# Patient Record
Sex: FEMALE | Race: BLACK OR AFRICAN AMERICAN | Employment: FULL TIME | URBAN - METROPOLITAN AREA
[De-identification: names, ages, dates, MRNs, and addresses within clinical notes are randomized per-mention and may not be internally consistent; named-entity substitution may affect disease eponyms.]

---

## 2020-11-30 ENCOUNTER — MEDICAL CORRESPONDENCE (OUTPATIENT)
Dept: HEALTH INFORMATION MANAGEMENT | Facility: CLINIC | Age: 50
End: 2020-11-30

## 2020-12-11 ENCOUNTER — TRANSCRIBE ORDERS (OUTPATIENT)
Dept: OTHER | Age: 50
End: 2020-12-11

## 2020-12-11 DIAGNOSIS — Z12.11 SCREEN FOR COLON CANCER: Primary | ICD-10-CM

## 2020-12-21 ENCOUNTER — MEDICAL CORRESPONDENCE (OUTPATIENT)
Dept: HEALTH INFORMATION MANAGEMENT | Facility: CLINIC | Age: 50
End: 2020-12-21

## 2020-12-22 ENCOUNTER — APPOINTMENT (OUTPATIENT)
Dept: INTERPRETER SERVICES | Facility: CLINIC | Age: 50
End: 2020-12-22

## 2020-12-22 ENCOUNTER — TRANSCRIBE ORDERS (OUTPATIENT)
Dept: GASTROENTEROLOGY | Facility: OUTPATIENT CENTER | Age: 50
End: 2020-12-22

## 2020-12-22 DIAGNOSIS — Z12.11 SPECIAL SCREENING FOR MALIGNANT NEOPLASMS, COLON: Primary | ICD-10-CM

## 2020-12-31 ENCOUNTER — APPOINTMENT (OUTPATIENT)
Dept: INTERPRETER SERVICES | Facility: CLINIC | Age: 50
End: 2020-12-31

## 2021-03-22 ENCOUNTER — THERAPY VISIT (OUTPATIENT)
Dept: OCCUPATIONAL THERAPY | Facility: CLINIC | Age: 51
End: 2021-03-22
Payer: OTHER MISCELLANEOUS

## 2021-03-22 DIAGNOSIS — M25.531 RIGHT WRIST PAIN: ICD-10-CM

## 2021-03-22 DIAGNOSIS — M79.641 HAND PAIN, RIGHT: ICD-10-CM

## 2021-03-22 PROCEDURE — 97140 MANUAL THERAPY 1/> REGIONS: CPT | Mod: GO | Performed by: OCCUPATIONAL THERAPIST

## 2021-03-22 PROCEDURE — 97165 OT EVAL LOW COMPLEX 30 MIN: CPT | Mod: GO | Performed by: OCCUPATIONAL THERAPIST

## 2021-03-22 PROCEDURE — 97760 ORTHOTIC MGMT&TRAING 1ST ENC: CPT | Mod: GO | Performed by: OCCUPATIONAL THERAPIST

## 2021-03-22 NOTE — PROGRESS NOTES
Hand Therapy Initial Evaluation    Current Date:  3/22/2021    Diagnosis: R carpal boss and FCR tendonitis  DOI: Sept 2020; orders 1/7/21  Precautions: currently on light duty for work    Subjective:  Guy Cobian is a 51 year old female.    Patient reports symptoms of the right hand which occurred due to R hand was hit at work last September. Pt reports that something slipped and her hand was hit on metal.  Since onset symptoms are Unchanged  General health as reported by patient is good.  Pertinent medical history includes:None  Medical allergies:none.  Surgical history: none.  Medication history: None.    Current occupation is works in sorting/prepping produce for a produce .   Job Tasks: Repetitive Tasks,typically cutting, but lately mainly putting labels on boxes.     Occupational Profile Information:  Right hand dominant  Prior functional level:  no limitations  Patient reports symptoms of pain  Special tests:  at [facility], notes not available. .    Previous treatment: none  Barriers include:none  Mobility: No difficulty  Currently working in normal job with restrictions  Other: Resides in [facility] with spouse, 4 kids      Objective:  Pain Level (Scale 0-10)   3/22/2021   At Rest Mild to moderate   With Use moderate     Pain Description  Date 3/22/2021   Location R dorsal wrist and volar radial wrist   Pain Quality Aching and Tender     VISUAL INSPECTION:  DATE 3/22/2021    Right   General posture of the upper extremity: [x]   Normal   []  Comments:   Joint alignment: [x]   Normal   []  Comments:   Color:  [x]   Normal   []  Comments:   Enlargements:  There is an enlargement, flattened, to dorsal wrist, carpal boss per MD order   Skin Appearance: [x]   Normal   []  Comments:        ROM  Pain Report: - none  + mild    ++ moderate    +++ severe   Wrist 3/22/2021 3/22/2021   AROM (PROM) R L   Extension 73 + 83   Flexion 65+ 67   RD     UD     Supination WNL WNL   Pronation WNL WNL        Strength:  Pain-free /Pinch Test    3/22/2021   Trials R L   1   19 35     Lat Pinch  3/22/2021   Trials R L   1   8 13.5     3 Pt Pinch  3/22/2021   Trials R L   1           Assessment:  Patient presents with symptoms consistent with diagnosis of right wrist and hand  pain,  with conservative intervention.     Patient's limitations or Problem List includes:  Pain and Weakness of the right wrist and hand which interferes with the patient's ability to perform Self Care Tasks , Work Tasks and Household Chores as compared to previous level of function.    Rehab Potential:  Good - Return to full activity, some limitations    Patient will benefit from skilled Occupational Therapy to increase ROM and  strength and decrease pain to return to previous activity level and resume normal daily tasks and to reach their rehab potential.    Barriers to Learning:  Language Mayra  via phone on ipad for eval    Communication Issues:  Patient appears to be able to clearly communicate and understand verbal and written communication and follow directions correctly.  Patient has an  for communication clarity.    Chart Review: Brief history including review of medical and/or therapy records relating to the presenting problem and Simple history review with patient    Identified Performance Deficits: home establishment and management, meal preparation and cleanup and work    Assessment of Occupational Performance:  3-5 Performance Deficits    Clinical Decision Making (Complexity): Low complexity    Treatment Explanation:  The following has been discussed with the patient:  RX ordered/plan of care  Anticipated outcomes  Possible risks and side effects    Plan:  Frequency:  1 X week, once daily  Duration:  for 4 weeks tapering to 2 X a month over 4 weeks (Work comp - requesting 6 visits to start)  Treatment Plan:   Modalities:  US, Fluidotherapy and Paraffin  Therapeutic Exercise:  AROM, PROM, Tendon  Gliding, Place and Hold, Extensor Tracking, Isotonics, Isometrics and Stabilization  Neuromuscular re-education:  Nerve Gliding, Proprioceptive Training and Kinesiotaping  Manual Techniques:  Myofascial release and Manual edema mobilization  Orthotic Fabrication:  Static orthosis and Forearm based orthosis  Self Care:  Self Care Tasks, Ergonomic Considerations and Work Tasks    Discharge Plan:  Achieve all LTG.  Independent in home treatment program.  Reach maximal therapeutic benefit.         No

## 2021-03-31 ENCOUNTER — APPOINTMENT (OUTPATIENT)
Dept: INTERPRETER SERVICES | Facility: CLINIC | Age: 51
End: 2021-03-31

## 2021-04-06 ENCOUNTER — THERAPY VISIT (OUTPATIENT)
Dept: OCCUPATIONAL THERAPY | Facility: CLINIC | Age: 51
End: 2021-04-06
Payer: OTHER MISCELLANEOUS

## 2021-04-06 DIAGNOSIS — M25.531 RIGHT WRIST PAIN: ICD-10-CM

## 2021-04-06 DIAGNOSIS — M79.641 HAND PAIN, RIGHT: Primary | ICD-10-CM

## 2021-04-06 PROCEDURE — 97035 APP MDLTY 1+ULTRASOUND EA 15: CPT | Mod: GO | Performed by: OCCUPATIONAL THERAPIST

## 2021-04-06 PROCEDURE — 97140 MANUAL THERAPY 1/> REGIONS: CPT | Mod: GO | Performed by: OCCUPATIONAL THERAPIST

## 2021-04-06 PROCEDURE — 97110 THERAPEUTIC EXERCISES: CPT | Mod: GO | Performed by: OCCUPATIONAL THERAPIST

## 2021-04-06 NOTE — PROGRESS NOTES
SOAP note information for 4/6/2021.  Please refer to the daily flowsheet for treatment today, total treatment time and time spent performing 1:1 timed codes.       Diagnosis: R carpal boss and FCR tendonitis  DOI: Sept 2020; orders 1/7/21  Precautions: currently on light duty for work    Subjective:  The pain persists, to dorsal wrist and volar radial wrist; but it is doing better  Mainly it affects carrying heavy items, at work  especially when its cold. Pt will see MD again, and will call to make appts if indicated.    Occupational Profile Information:  Right hand dominant  Patient reports symptoms of pain  Special tests:  at [facility], notes not available. .    Currently working in normal job with restrictions  Other: Resides in [facility] with spouse, 4 kids   Current occupation is works in sorting/prepping produce for a produce .   Job Tasks: Repetitive Tasks,typically cutting, but lately mainly putting labels on boxes.     Objective:  Pain Level (Scale 0-10)   3/22/2021 4/6/2021     At Rest Mild to moderate mild   With Use moderate      Pain Description  Date 3/22/2021 4/6/2021     Location R dorsal wrist and volar radial wrist Same area, some improvement noted   Pain Quality Aching and Tender      VISUAL INSPECTION:  DATE 3/22/2021 4/6/2021      Right R   General posture of the upper extremity: [x]   Normal   []  Comments:    Joint alignment: [x]   Normal   []  Comments:    Color:  [x]   Normal   []  Comments:    Enlargements:  There is an enlargement, flattened, to dorsal wrist, carpal boss per MD order    Skin Appearance: [x]   Normal   []  Comments:    other  some clicking noted with wrist motion at times, unsure of origin of clicking      ROM  Pain Report: - none  + mild    ++ moderate    +++ severe   Wrist 3/22/2021 3/22/2021   AROM (PROM) R L   Extension 73 + 83   Flexion 65+ 67   RD     UD     Supination WNL WNL   Pronation WNL WNL     Strength:  Pain-free /Pinch Test    3/22/2021    Trials R L   1   19 35     Lat Pinch  3/22/2021   Trials R L   1   8 13.5

## 2021-04-21 PROBLEM — M79.641 HAND PAIN, RIGHT: Status: RESOLVED | Noted: 2021-03-22 | Resolved: 2021-04-21

## 2021-04-21 PROBLEM — M25.531 RIGHT WRIST PAIN: Status: RESOLVED | Noted: 2021-03-22 | Resolved: 2021-04-21

## 2021-04-21 NOTE — PROGRESS NOTES
Discharge Summary - Hand Therapy    4/21/2021    Patient did not return to therapy.    This episode of care will be resolved.  Plan: Discharge from hand therapy.    Reina Milan), MS, OTR/L, CHT

## 2021-09-29 ENCOUNTER — ANCILLARY PROCEDURE (OUTPATIENT)
Dept: MAMMOGRAPHY | Facility: CLINIC | Age: 51
End: 2021-09-29
Attending: NURSE PRACTITIONER
Payer: COMMERCIAL

## 2021-09-29 PROCEDURE — 77066 DX MAMMO INCL CAD BI: CPT | Performed by: RADIOLOGY

## 2021-09-29 PROCEDURE — G0279 TOMOSYNTHESIS, MAMMO: HCPCS | Performed by: RADIOLOGY

## 2021-09-29 PROCEDURE — 76642 ULTRASOUND BREAST LIMITED: CPT | Mod: LT | Performed by: RADIOLOGY

## 2025-06-20 ENCOUNTER — HOSPITAL ENCOUNTER (EMERGENCY)
Facility: CLINIC | Age: 55
Discharge: HOME | End: 2025-06-20
Attending: EMERGENCY MEDICINE | Admitting: EMERGENCY MEDICINE
Payer: COMMERCIAL

## 2025-06-20 ENCOUNTER — APPOINTMENT (OUTPATIENT)
Dept: GENERAL RADIOLOGY | Facility: CLINIC | Age: 55
End: 2025-06-20
Attending: EMERGENCY MEDICINE
Payer: COMMERCIAL

## 2025-06-20 VITALS
RESPIRATION RATE: 16 BRPM | TEMPERATURE: 97 F | OXYGEN SATURATION: 97 % | HEART RATE: 81 BPM | DIASTOLIC BLOOD PRESSURE: 124 MMHG | SYSTOLIC BLOOD PRESSURE: 167 MMHG

## 2025-06-20 DIAGNOSIS — S80.12XA CONTUSION OF LEFT CALF, INITIAL ENCOUNTER: ICD-10-CM

## 2025-06-20 DIAGNOSIS — S90.32XA CONTUSION OF LEFT FOOT, INITIAL ENCOUNTER: ICD-10-CM

## 2025-06-20 PROCEDURE — 250N000013 HC RX MED GY IP 250 OP 250 PS 637: Performed by: EMERGENCY MEDICINE

## 2025-06-20 PROCEDURE — 99283 EMERGENCY DEPT VISIT LOW MDM: CPT

## 2025-06-20 PROCEDURE — 73590 X-RAY EXAM OF LOWER LEG: CPT | Mod: LT

## 2025-06-20 PROCEDURE — 73630 X-RAY EXAM OF FOOT: CPT | Mod: LT

## 2025-06-20 RX ORDER — ACETAMINOPHEN 500 MG
1000 TABLET ORAL ONCE
Status: COMPLETED | OUTPATIENT
Start: 2025-06-20 | End: 2025-06-20

## 2025-06-20 RX ORDER — IBUPROFEN 200 MG
400 TABLET ORAL ONCE
Status: COMPLETED | OUTPATIENT
Start: 2025-06-20 | End: 2025-06-20

## 2025-06-20 RX ADMIN — ACETAMINOPHEN 1000 MG: 500 TABLET ORAL at 13:29

## 2025-06-20 RX ADMIN — IBUPROFEN 400 MG: 200 TABLET, FILM COATED ORAL at 13:30

## 2025-06-20 ASSESSMENT — COLUMBIA-SUICIDE SEVERITY RATING SCALE - C-SSRS
2. HAVE YOU ACTUALLY HAD ANY THOUGHTS OF KILLING YOURSELF IN THE PAST MONTH?: NO
1. IN THE PAST MONTH, HAVE YOU WISHED YOU WERE DEAD OR WISHED YOU COULD GO TO SLEEP AND NOT WAKE UP?: NO
6. HAVE YOU EVER DONE ANYTHING, STARTED TO DO ANYTHING, OR PREPARED TO DO ANYTHING TO END YOUR LIFE?: NO

## 2025-06-20 ASSESSMENT — ACTIVITIES OF DAILY LIVING (ADL)
ADLS_ACUITY_SCORE: 41

## 2025-06-20 NOTE — ED NOTES
[place] in her left ankle and knocked to the ground by a falling AC hose.  VSS per EMS.

## 2025-06-20 NOTE — Clinical Note
Aranza Austin was seen and treated in our emergency department on 6/20/2025.  She may return to work on 06/23/2025.  Ms. Austin was seen in our emergency department for a work-related injury.  She may need the next several days to recover.  She may return to work without restrictions when she feels well enough to perform her work duties.     If you have any questions or concerns, please don't hesitate to call.      Trierweiler, Chad A, MD

## 2025-06-20 NOTE — ED PROVIDER NOTES
Emergency Department Note      History of Present Illness     Chief Complaint   Leg Pain      FABIO Austin is a 55 year old female presenting for evaluation of leg pain. 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 She endorses pain in her left hip and leg. The medial side of her left lower leg is bruised and is the spot causing her the most pain. The patient can move her left hip and she denies pain anywhere else.    Independent Historian   None    Review of External Notes   None    Past Medical History     Medical History and Problem List   No past medical history on file.    Medications   No current outpatient medications on file.    Surgical History   No past surgical history on file.    Physical Exam     Patient Vitals for the past 24 hrs:   BP Temp Temp src Pulse Resp SpO2   06/20/25 1107 (!) 167/124 97  F (36.1  C) Temporal 81 16 97 %     Physical Exam    Musculoskeletal: There is no bony tenderness to the medial or lateral malleoli.  There is no tenderness along the fifth metatarsal.  There is no tenderness over the forefoot or the Lisfranc joint.  Normal movement of the digits.  Mild tenderness over the lateral calcaneus without significant bruising.    Skin: The foot is warm with strong DP pulse and PT pulse.  No other rash or lesion. There is bruising noted over the calf with soft compartments.    Neuro: There is normal sensation through all 3 peripheral nerve distributions.  Strength is limited secondary to pain.    Psychiatric: Normal affect      Diagnostics     Lab Results   Labs Ordered and Resulted from Time of ED Arrival to Time of ED Departure - No data to display    Imaging   XR Tibia and Fibula Left 2 Views   Preliminary Result   IMPRESSION: There is no evidence of an acute displaced left tibia, fibula or foot fracture. Joint spaces of the left foot are maintained. No Lisfranc  subluxation. Mild distal Achilles enthesopathy. Small calcaneal heel spur.      Foot XR, G/E 3 views, left   Preliminary Result   IMPRESSION: There is no evidence of an acute displaced left tibia, fibula or foot fracture. Joint spaces of the left foot are maintained. No Lisfranc subluxation. Mild distal Achilles enthesopathy. Small calcaneal heel spur.        Independent Interpretation   X-ray Foot shows no fracture  XR Tibia and Fibula Left no fracture    ED Course      Medications Administered   Medications   acetaminophen (TYLENOL) tablet 1,000 mg (1,000 mg Oral $Given 6/20/25 1329)   ibuprofen (ADVIL/MOTRIN) tablet 400 mg (400 mg Oral $Given 6/20/25 1330)       Procedures   Procedures     Discussion of Management   None    ED Course   ED Course as of 06/20/25 1346   Fri Jun 20, 2025   1247 I obtained history and performed physical exam.   1345 I rechecked on the patient and prepared her for discharge.       Additional Documentation  None    Medical Decision Making / Diagnosis     CMS Diagnoses: None    MIPS   None               [facility]   Vipulrobyn Guy Austin is a 55 year old female presenting after suffering an injury at work.  [place] and there was a air conditioning hose running on the ground attached to a compressor.  This came loose, swinging back-and-forth on the ground, wrapping itself around the patient and striking her to the back of the calf.  She did suffer a fall onto her hip.  However, she is ambulatory afterward.  She presents for investigation of bruising to her calf and to the foot.    On my exam, she appears clinically well.  Her compartments are soft.  Considering this occurred at work and was more of a contusion, x-rays were obtained out of an abundance of caution.  Fortunately, the studies are negative.  I feel she is safe for discharge home and she does feel improved after Motrin and Tylenol.  She was given a note for work.  She will otherwise return to us for any worsening of  condition or other emergent concerns.    Disposition   The patient was discharged.     Diagnosis     ICD-10-CM    1. Contusion of left calf, initial encounter  S80.12XA       2. Contusion of left foot, initial encounter  S90.32XA            Discharge Medications   New Prescriptions    No medications on file         Scribe Disclosure:  I, Mary Kay Cristino, am serving as a scribe at 12:53 PM on 6/20/2025 to document services personally performed by Trierweiler, Chad A, MD based on my observations and the provider's statements to me.        Trierweiler, Chad A, MD  06/20/25 [id number]